# Patient Record
Sex: FEMALE | Race: WHITE | Employment: STUDENT | URBAN - METROPOLITAN AREA
[De-identification: names, ages, dates, MRNs, and addresses within clinical notes are randomized per-mention and may not be internally consistent; named-entity substitution may affect disease eponyms.]

---
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
## 2021-01-28 NOTE — PROGRESS NOTES
BP: 112/52    LAST PAP/EXAM: No results found for: PAP  URINE HCG:not indicated    The following medication was given:     MEDICATION: Depo Provera 150mg  ROUTE: IM  SITE: Ventrogluteal - Left  : Amphastar  LOT #: [id number]  EXP:5/1/2020  NEXT INJECTION DUE: 2/22/19 - 3/8/19   Provider: Dr. Shayne Zamora [facility]        
no